# Patient Record
Sex: FEMALE | HISPANIC OR LATINO | Employment: FULL TIME | URBAN - METROPOLITAN AREA
[De-identification: names, ages, dates, MRNs, and addresses within clinical notes are randomized per-mention and may not be internally consistent; named-entity substitution may affect disease eponyms.]

---

## 2018-07-13 ENCOUNTER — PATIENT OUTREACH (OUTPATIENT)
Dept: HEALTH INFORMATION MANAGEMENT | Facility: OTHER | Age: 28
End: 2018-07-13

## 2023-08-15 ENCOUNTER — OFFICE VISIT (OUTPATIENT)
Dept: URGENT CARE | Facility: CLINIC | Age: 33
End: 2023-08-15
Payer: COMMERCIAL

## 2023-08-15 VITALS
DIASTOLIC BLOOD PRESSURE: 80 MMHG | SYSTOLIC BLOOD PRESSURE: 112 MMHG | WEIGHT: 145 LBS | OXYGEN SATURATION: 98 % | TEMPERATURE: 98.7 F | RESPIRATION RATE: 16 BRPM | BODY MASS INDEX: 27.38 KG/M2 | HEART RATE: 72 BPM | HEIGHT: 61 IN

## 2023-08-15 DIAGNOSIS — R10.9 ABDOMINAL PAIN, UNSPECIFIED ABDOMINAL LOCATION: ICD-10-CM

## 2023-08-15 LAB
APPEARANCE UR: CLEAR
BILIRUB UR STRIP-MCNC: NEGATIVE MG/DL
COLOR UR AUTO: YELLOW
GLUCOSE UR STRIP.AUTO-MCNC: NEGATIVE MG/DL
KETONES UR STRIP.AUTO-MCNC: NEGATIVE MG/DL
LEUKOCYTE ESTERASE UR QL STRIP.AUTO: NEGATIVE
NITRITE UR QL STRIP.AUTO: NEGATIVE
PH UR STRIP.AUTO: 5.5 [PH] (ref 5–8)
POCT INT CON NEG: NEGATIVE
POCT INT CON POS: POSITIVE
POCT URINE PREGNANCY TEST: NEGATIVE
PROT UR QL STRIP: NEGATIVE MG/DL
RBC UR QL AUTO: NEGATIVE
SP GR UR STRIP.AUTO: 1
UROBILINOGEN UR STRIP-MCNC: 0.2 MG/DL

## 2023-08-15 PROCEDURE — 3079F DIAST BP 80-89 MM HG: CPT | Performed by: NURSE PRACTITIONER

## 2023-08-15 PROCEDURE — 99203 OFFICE O/P NEW LOW 30 MIN: CPT | Performed by: NURSE PRACTITIONER

## 2023-08-15 PROCEDURE — 81002 URINALYSIS NONAUTO W/O SCOPE: CPT | Performed by: NURSE PRACTITIONER

## 2023-08-15 PROCEDURE — 3074F SYST BP LT 130 MM HG: CPT | Performed by: NURSE PRACTITIONER

## 2023-08-15 PROCEDURE — 81025 URINE PREGNANCY TEST: CPT | Performed by: NURSE PRACTITIONER

## 2023-08-15 ASSESSMENT — ENCOUNTER SYMPTOMS
FEVER: 0
VOMITING: 0
ABDOMINAL PAIN: 1
FLATUS: 0
DIARRHEA: 0
NAUSEA: 0

## 2023-08-15 ASSESSMENT — FIBROSIS 4 INDEX: FIB4 SCORE: 0.93

## 2023-08-15 NOTE — LETTER
August 15, 2023    To Whom It May Concern:         This is confirmation that Varsha FoleyCandace attended her scheduled appointment with DAVID Yancey on 8/15/23. Please excuse her absence due to an acute illness.          If you have any questions please do not hesitate to call me at the phone number listed below.    Sincerely,          FELY Yancey.  [phone]

## 2023-08-16 ENCOUNTER — HOSPITAL ENCOUNTER (OUTPATIENT)
Dept: RADIOLOGY | Facility: MEDICAL CENTER | Age: 33
End: 2023-08-16
Attending: NURSE PRACTITIONER
Payer: COMMERCIAL

## 2023-08-16 DIAGNOSIS — R10.9 ABDOMINAL PAIN, UNSPECIFIED ABDOMINAL LOCATION: ICD-10-CM

## 2023-08-16 PROCEDURE — 76830 TRANSVAGINAL US NON-OB: CPT

## 2023-08-16 NOTE — PROGRESS NOTES
"Subjective:     Varsha Arias is a 32 y.o. female who presents for Abdominal Pain (Left lower x 1 day)      Abdominal Pain  This is a new problem. The current episode started in the past 7 days (3 days of LLQ abd pain). The problem has been gradually worsening. The pain is located in the LLQ. The pain is at a severity of 5/10. Pertinent negatives include no diarrhea, fever, flatus, nausea or vomiting. The pain is relieved by Nothing. Treatments tried: motrin. The treatment provided moderate relief. Her past medical history is significant for abdominal surgery. c section         Review of Systems   Constitutional:  Negative for fever.   Gastrointestinal:  Positive for abdominal pain. Negative for diarrhea, flatus, nausea and vomiting.       PMH:   Past Medical History:   Diagnosis Date    History of  3/27/2013     ALLERGIES: No Known Allergies  SURGHX:   Past Surgical History:   Procedure Laterality Date    REPEAT C SECTION  2014    Performed by Rubio Meeks M.D. at [facility]    PRIMARY C SECTION  3/22/12    Emergency Failure to progress passed 7cm     SOCHX:   Social History     Socioeconomic History    Marital status: Single   Tobacco Use    Smoking status: Never    Smokeless tobacco: Never   Vaping Use    Vaping Use: Never used   Substance and Sexual Activity    Alcohol use: No     Comment: none    Drug use: No     Comment: none    Sexual activity: Never     Partners: Male     Comment: none   Social History Narrative    ** Merged History Encounter **          FH: No family history on file.      Objective:   /80 (BP Location: Left arm, Patient Position: Sitting, BP Cuff Size: Adult)   Pulse 72   Temp 37.1 °C (98.7 °F)   Resp 16   Ht 1.549 m (5' 1\")   Wt 65.8 kg (145 lb)   SpO2 98%   BMI 27.40 kg/m²     Physical Exam  Vitals and nursing note reviewed.   Constitutional:       General: She is not in acute distress.     Appearance: Normal appearance. She is not " ill-appearing.   HENT:      Head: Normocephalic and atraumatic.      Right Ear: External ear normal.      Left Ear: External ear normal.      Nose: No congestion or rhinorrhea.      Mouth/Throat:      Mouth: Mucous membranes are moist.   Eyes:      Extraocular Movements: Extraocular movements intact.      Pupils: Pupils are equal, round, and reactive to light.   Cardiovascular:      Rate and Rhythm: Normal rate and regular rhythm.      Pulses: Normal pulses.      Heart sounds: Normal heart sounds.   Pulmonary:      Effort: Pulmonary effort is normal.      Breath sounds: Normal breath sounds.   Abdominal:      General: Abdomen is flat. Bowel sounds are normal.      Palpations: Abdomen is soft.      Tenderness: There is abdominal tenderness in the left lower quadrant. There is no right CVA tenderness, left CVA tenderness, guarding or rebound.       Musculoskeletal:         General: Normal range of motion.      Cervical back: Normal range of motion and neck supple.   Skin:     General: Skin is warm and dry.      Capillary Refill: Capillary refill takes less than 2 seconds.   Neurological:      General: No focal deficit present.      Mental Status: She is alert and oriented to person, place, and time. Mental status is at baseline.   Psychiatric:         Mood and Affect: Mood normal.         Behavior: Behavior normal.         Thought Content: Thought content normal.         Judgment: Judgment normal.         Assessment/Plan:   Assessment    1. Abdominal pain, unspecified abdominal location  POCT Pregnancy    POCT Urinalysis    US-PELVIC COMPLETE (TRANSABDOMINAL/TRANSVAGINAL) (COMBO)        Ultrasound ordered stat for evaluation of left lower quadrant pain/pelvic pain.  Differential diagnoses discussed.  She is due to start her menses and this may be related to ovarian cyst.  She notes that she has had ovarian cyst in the past.  Pregnancy and urine negative in the clinic.  Strict ER precautions given for worsening pain.   Patient to self schedule ultrasound tomorrow morning as scheduling is now closed.  She is in agreement with plan of care.

## 2023-08-18 DIAGNOSIS — N83.202 HEMORRHAGIC CYST OF LEFT OVARY: ICD-10-CM

## 2024-04-04 ENCOUNTER — OFFICE VISIT (OUTPATIENT)
Dept: URGENT CARE | Facility: PHYSICIAN GROUP | Age: 34
End: 2024-04-04
Payer: COMMERCIAL

## 2024-04-04 ENCOUNTER — HOSPITAL ENCOUNTER (OUTPATIENT)
Dept: RADIOLOGY | Facility: MEDICAL CENTER | Age: 34
End: 2024-04-04
Attending: PHYSICIAN ASSISTANT
Payer: COMMERCIAL

## 2024-04-04 VITALS
DIASTOLIC BLOOD PRESSURE: 72 MMHG | RESPIRATION RATE: 16 BRPM | BODY MASS INDEX: 28.13 KG/M2 | WEIGHT: 149 LBS | OXYGEN SATURATION: 97 % | HEART RATE: 68 BPM | SYSTOLIC BLOOD PRESSURE: 110 MMHG | TEMPERATURE: 98.4 F | HEIGHT: 61 IN

## 2024-04-04 DIAGNOSIS — R10.2 PELVIC PAIN: ICD-10-CM

## 2024-04-04 LAB
APPEARANCE UR: CLEAR
BILIRUB UR STRIP-MCNC: NEGATIVE MG/DL
COLOR UR AUTO: YELLOW
GLUCOSE UR STRIP.AUTO-MCNC: NEGATIVE MG/DL
KETONES UR STRIP.AUTO-MCNC: NEGATIVE MG/DL
LEUKOCYTE ESTERASE UR QL STRIP.AUTO: NEGATIVE
NITRITE UR QL STRIP.AUTO: NEGATIVE
PH UR STRIP.AUTO: 6 [PH] (ref 5–8)
POCT INT CON NEG: NEGATIVE
POCT INT CON POS: POSITIVE
POCT URINE PREGNANCY TEST: NEGATIVE
PROT UR QL STRIP: NEGATIVE MG/DL
RBC UR QL AUTO: NORMAL
SP GR UR STRIP.AUTO: <=1.005
UROBILINOGEN UR STRIP-MCNC: 0.2 MG/DL

## 2024-04-04 PROCEDURE — 81025 URINE PREGNANCY TEST: CPT | Performed by: PHYSICIAN ASSISTANT

## 2024-04-04 PROCEDURE — 81002 URINALYSIS NONAUTO W/O SCOPE: CPT | Performed by: PHYSICIAN ASSISTANT

## 2024-04-04 PROCEDURE — 99214 OFFICE O/P EST MOD 30 MIN: CPT | Performed by: PHYSICIAN ASSISTANT

## 2024-04-04 PROCEDURE — 3078F DIAST BP <80 MM HG: CPT | Performed by: PHYSICIAN ASSISTANT

## 2024-04-04 PROCEDURE — 76830 TRANSVAGINAL US NON-OB: CPT

## 2024-04-04 PROCEDURE — 3074F SYST BP LT 130 MM HG: CPT | Performed by: PHYSICIAN ASSISTANT

## 2024-04-04 ASSESSMENT — ENCOUNTER SYMPTOMS
EYE REDNESS: 0
EYE DISCHARGE: 0
VOMITING: 0
ABDOMINAL PAIN: 1
NAUSEA: 0
FEVER: 0
DIARRHEA: 1

## 2024-04-04 ASSESSMENT — FIBROSIS 4 INDEX: FIB4 SCORE: 0.96

## 2024-04-04 NOTE — PROGRESS NOTES
"Subjective     Varshaepi Arias is a 33 y.o. female who presents with Abdominal Pain (Lower left, cramping /X this AM )            HPI    This is a new problem.  The patient presents to clinic complaining of intermittent pelvic pain x 2 months with associated cramping.  The patient states her intermittent pelvic pain waxes and wanes in severity.  The patient states today she is experiencing a sharp pain to the left side of her pelvic region.  The patient notes slight radiation of pain to her low back.  The patient reports no associated fever.  She also reports no nausea, vomiting, or diarrhea.  The patient states she is experiencing possible constipation with infrequent bowel movements.  The patient states her last bowel movement was earlier today.  The patient reports no bloody stools.  She also reports no urinary symptoms.  No dysuria.  The patient states she is experiencing intermittent spotting with associated \"bloody mucus\".  The patient states she has had similar spotting last month prior to her menstrual period.  The patient states her last menstrual period was approximately 1 week ago.  The patient reports no abnormal vaginal discharge/drainage.  The patient has not taken any OTC medications for her current symptoms.  The patient states she is scheduled to follow-up with gynecology on 2024.  The patient states she has had 3 prior C-sections.    PMH:  has a past medical history of History of  (3/27/2013).    She has no past medical history of Addisons disease ([facility]), Adrenal disorder ([facility]), Allergy, Anemia, Anxiety, Arrhythmia, Arthritis, ASTHMA, Blood transfusion, Cancer ([facility]), CATARACT, CHF (congestive heart failure) ([facility]), Clotting disorder ([facility]), COPD, Cushings syndrome ([facility]), Depression, Diabetes, Diabetic neuropathy ([facility]), EMPHYSEMA, GERD (gastroesophageal reflux disease), Glaucoma, Goiter, Headache(784.0), Heart attack (HCC), Heart murmur, HIV (human immunodeficiency virus " "infection), Hyperlipidemia, Hypertension, IBD (inflammatory bowel disease), Kidney disease, Meningitis, Migraine, Muscle disorder, OSTEOPOROSIS, Parathyroid disorder (HCC), Pituitary disease (HCC), Seizure (HCC), Sickle cell disease (HCC), Stroke (HCC), Substance abuse (HCC), Thyroid disease, Tuberculosis, Ulcer, or Urinary tract infection, site not specified.  MEDS:   Current Outpatient Medications:     omeprazole (PRILOSEC OTC) 20 MG tablet, Take 1 Tab by mouth every day. (Patient not taking: Reported on 8/15/2023), Disp: 30 Tab, Rfl: 11    ondansetron (ZOFRAN) 4 MG TABS, Take 1 Tab by mouth every 8 hours as needed for Nausea/Vomiting. (Patient not taking: Reported on 8/15/2023), Disp: 10 Each, Rfl: 1    hydrocodone-acetaminophen (NORCO) 5-325 MG TABS per tablet, Take 1-2 Tabs by mouth every 6 hours as needed. (Patient not taking: Reported on 8/15/2023), Disp: 20 Each, Rfl: 0    Prenatal MV-Min-Fe Fum-FA-DHA (PRENATAL 1 PO), Take  by mouth. (Patient not taking: Reported on 8/15/2023), Disp: , Rfl:   ALLERGIES: No Known Allergies  SURGHX:   Past Surgical History:   Procedure Laterality Date    REPEAT C SECTION  4/16/2014    Performed by Rubio Meeks M.D. at [facility]    PRIMARY C SECTION  3/22/12    Emergency Failure to progress passed 7cm     SOCHX:  reports that she has never smoked. She has never used smokeless tobacco. She reports that she does not drink alcohol and does not use drugs.  FH: Family history was reviewed, no pertinent findings to report    Review of Systems   Constitutional:  Negative for fever.   Eyes:  Negative for discharge and redness.   Gastrointestinal:  Positive for abdominal pain and diarrhea. Negative for nausea and vomiting.   Genitourinary:  Negative for dysuria.              Objective     /72   Pulse 68   Temp 36.9 °C (98.4 °F) (Temporal)   Resp 16   Ht 1.549 m (5' 1\")   Wt 67.6 kg (149 lb)   SpO2 97%   BMI 28.15 kg/m²      Physical Exam  Constitutional:       " General: She is not in acute distress.     Appearance: Normal appearance. She is well-developed. She is not ill-appearing.   HENT:      Head: Normocephalic and atraumatic.      Right Ear: External ear normal.      Left Ear: External ear normal.   Eyes:      Extraocular Movements: Extraocular movements intact.      Conjunctiva/sclera: Conjunctivae normal.   Cardiovascular:      Rate and Rhythm: Normal rate and regular rhythm.      Heart sounds: Normal heart sounds.   Pulmonary:      Effort: Pulmonary effort is normal. No respiratory distress.      Breath sounds: Normal breath sounds. No wheezing.   Abdominal:      Palpations: Abdomen is soft.      Tenderness: There is abdominal tenderness in the suprapubic area. There is no right CVA tenderness, left CVA tenderness, guarding or rebound.   Musculoskeletal:         General: Normal range of motion.      Cervical back: Normal range of motion and neck supple.   Skin:     General: Skin is warm and dry.   Neurological:      Mental Status: She is alert and oriented to person, place, and time.               Progress:    The patient was previously seen in clinic in August 2023 for similar symptoms.  The patient had a pelvic ultrasound done at that time, which showed a myometrial lesion measuring up to 4.3 cm likely related to a fibroid, as well as a likely hemorrhagic cyst of the left ovary.  Follow-up was recommended regarding the patient's hemorrhagic cyst.  However, the patient states she never followed up as directed.    Results for orders placed or performed in visit on 04/04/24   POCT Urinalysis   Result Value Ref Range    POC Color yellow Negative    POC Appearance clear Negative    POC Glucose negative Negative mg/dL    POC Bilirubin negative Negative mg/dL    POC Ketones negative Negative mg/dL    POC Specific Gravity <=1.005 <1.005 - >1.030    POC Blood trace-lysed Negative    POC Urine PH 6.0 5.0 - 8.0    POC Protein negative Negative mg/dL    POC Urobiligen 0.2  Negative (0.2) mg/dL    POC Nitrites negative Negative    POC Leukocyte Esterase negative Negative   POCT Pregnancy   Result Value Ref Range    POC Urine Pregnancy Test Negative     Internal Control Positive Positive     Internal Control Negative Negative          Pelvic US:  COMPARISON:   8/16/2023     FINDINGS:  Both transabdominal and transvaginal scanning were performed to optimally visualize the pelvis.     UTERUS:  The uterus measures 4.95 cm x 7.55 cm x 6.39 cm.  The uterine myometrium is inhomogeneous. There is a 3.3 cm fibroid in the posterior uterine fundus with some deformity of the adjacent endometrium.     The endometrial echo complex measures 1.00 cm.  The endometrium is unremarkable in appearance and thickness for age and menstrual status.        OVARIES:  The right ovary measures 3.14 cm x 2.41 cm x 2.51 cm. Duplex Doppler examination of the right ovary shows normal waveforms.     The left ovary measures 3.59 cm x 2.00 cm x 1.90 cm. Duplex Doppler examination of the left ovary shows normal waveforms.     Small free fluid is seen.     IMPRESSION:     1.  A 3.3 cm fibroid is noted in the posterior uterine fundus, similar to prior.     2.  Unremarkable bilateral ovaries.                    Assessment & Plan          1. Pelvic pain  - POCT Urinalysis  - POCT Pregnancy  - US-PELVIC COMPLETE (TRANSABDOMINAL/TRANSVAGINAL) (COMBO); Future  - naproxen (NAPROSYN) 500 MG Tab; Take 1 Tablet by mouth 2 times a day with meals.  Dispense: 60 Tablet; Refill: 0      The patient's presenting symptoms and physical exam findings are consistent with pelvic pain.  This has been an ongoing problem.  On physical exam, the patient had mild suprapubic abdominal tenderness without guarding or rebound.  No CVA tenderness was appreciated.  The remainder the patient's physical exam today in clinic was normal.  The patient is nontoxic and appears in no acute distress.  The patient's vital signs are stable and within normal limits.   She is afebrile today in clinic.  The patient's POCT urinalysis today in clinic showed trace lysed blood with negative leukocyte esterase and negative nitrates.  The patient's POCT pregnancy test was negative.  Will order a pelvic ultrasound to further evaluate the patient's current symptoms.  The patient's pelvic ultrasound showed a 3.3 cm fibroid in the posterior uterine fundus, similar to the patient's prior study. The patient's ovaries were seen as unremarkable. The cause of the patient's ongoing pelvic cramping is unknown at this time. Thankfully, the patient is scheduled to follow-up with gynecology in the coming weeks. Advised the patient to follow-up with gynecology as scheduled.  Instructed the patient to monitor for worsening signs or symptoms.  Will prescribe the patient naproxen for symptomatic leaf of her pelvic pain and cramping.  Recommend OTC medications and supportive care for symptomatic management.  Recommend patient follow-up with primary care as needed.  Discussed strict return precautions with the patient, and she verbalized understanding.    Differential diagnoses, supportive care, and indications for immediate follow-up discussed with patient.   Instructed to return to clinic or nearest emergency department for any change in condition, further concerns, or worsening of symptoms.    OTC Tylenol or Motrin for fever/discomfort.  Drink plenty of fluids  Follow-up with PCP  Return to clinic or go to the ED if symptoms worsen or fail to improve, or if the patient should develop worsening/increasing/persistent pelvic pain, abnormal vaginal bleeding, abnormal vaginal discharge, urinary symptoms, hematuria, flank pain, abdominal pain, nausea/vomiting, fever/chills, and/or any concerning symptoms.    Discussed plan with the patient, and she agrees to the above.     I personally reviewed prior external notes and test results pertinent to today's visit.  I have independently reviewed and interpreted all  diagnostics ordered during this urgent care visit.     Please note that this dictation was created using voice recognition software. I have made every reasonable attempt to correct obvious errors, but I expect that there may be errors of grammar and possibly content that I did not discover before finalizing the note.     This note was electronically signed by Brigitte Morgan PA-C

## 2024-04-05 RX ORDER — NAPROXEN 500 MG/1
500 TABLET ORAL 2 TIMES DAILY WITH MEALS
Qty: 60 TABLET | Refills: 0 | Status: SHIPPED | OUTPATIENT
Start: 2024-04-05 | End: 2024-04-19

## 2024-04-19 ENCOUNTER — GYNECOLOGY VISIT (OUTPATIENT)
Dept: OBGYN | Facility: CLINIC | Age: 34
End: 2024-04-19
Payer: COMMERCIAL

## 2024-04-19 ENCOUNTER — HOSPITAL ENCOUNTER (OUTPATIENT)
Facility: MEDICAL CENTER | Age: 34
End: 2024-04-19
Attending: OBSTETRICS & GYNECOLOGY
Payer: COMMERCIAL

## 2024-04-19 VITALS — SYSTOLIC BLOOD PRESSURE: 80 MMHG | WEIGHT: 143 LBS | DIASTOLIC BLOOD PRESSURE: 63 MMHG | BODY MASS INDEX: 27.02 KG/M2

## 2024-04-19 DIAGNOSIS — R10.2 PELVIC PAIN: Primary | ICD-10-CM

## 2024-04-19 DIAGNOSIS — D25.0 SUBMUCOUS LEIOMYOMA OF UTERUS: ICD-10-CM

## 2024-04-19 DIAGNOSIS — Z12.4 SCREENING FOR MALIGNANT NEOPLASM OF CERVIX: ICD-10-CM

## 2024-04-19 DIAGNOSIS — Z30.011 ENCOUNTER FOR INITIAL PRESCRIPTION OF CONTRACEPTIVE PILLS: ICD-10-CM

## 2024-04-19 PROBLEM — D25.9 UTERINE FIBROID: Status: ACTIVE | Noted: 2024-04-19

## 2024-04-19 PROBLEM — R10.32 LLQ PAIN: Status: ACTIVE | Noted: 2024-04-19

## 2024-04-19 PROCEDURE — 88175 CYTOPATH C/V AUTO FLUID REDO: CPT

## 2024-04-19 PROCEDURE — 3074F SYST BP LT 130 MM HG: CPT | Performed by: OBSTETRICS & GYNECOLOGY

## 2024-04-19 PROCEDURE — 3078F DIAST BP <80 MM HG: CPT | Performed by: OBSTETRICS & GYNECOLOGY

## 2024-04-19 PROCEDURE — 87624 HPV HI-RISK TYP POOLED RSLT: CPT

## 2024-04-19 PROCEDURE — 99203 OFFICE O/P NEW LOW 30 MIN: CPT | Performed by: OBSTETRICS & GYNECOLOGY

## 2024-04-19 RX ORDER — NORETHINDRONE ACETATE AND ETHINYL ESTRADIOL AND FERROUS FUMARATE 1MG-20(24)
1 KIT ORAL DAILY
Qty: 28 TABLET | Refills: 5 | Status: SHIPPED | OUTPATIENT
Start: 2024-04-19

## 2024-04-19 ASSESSMENT — FIBROSIS 4 INDEX: FIB4 SCORE: 0.96

## 2024-04-19 NOTE — PROGRESS NOTES
Patient here for GYN exam.  LMP= 4/19/24  BCM:none   Last pap: 3 years ago per pt   Phone number: [phone] (home)   Pharmacy verified    Pt states been having pelvic pain ( left side) since August 2023

## 2024-04-19 NOTE — PROGRESS NOTES
New GYN Problem Note    CC:   Gynecologic Exam      HPI:   Patient is a 33 y.o.  who presents complaining of intermittent but persistent LLQ pain since about August. She went to the ED in August for this and was dx with a small hemorrhagic ovarian cyst at that time but she was again in ED in early April for the same complaints and US this time was normal with no ovarian findings whatsoever.  She was noted on both US to have a 3cm posterior uterine fibroid abutting the endometrium but patient actually has no menstrual complaints. She reports regular menses and not too heavy or painful.  She states she will notice the same LLQ pain about a week before menses and a week after menses as well as intermittently and/or during intercourse.  This is a completely new problem since August and NOT something she has felt all her life.       GYNECOLOGIC HISTORY:   Current Sexual Activity: yes  History of sexually transmitted diseases? No  Abnormal discharge? No     Menstrual History  Patient's last menstrual period was No LMP recorded.  Periods are regular  q 28 days, lasting 5-6 days.     Clots or heavy flow: No  Intermenstrual bleeding/spotting: No  Sexual problems: No  Significant pelvic pain: No  Bothersome PMS: No  Bothersome menopausal symptoms: No     Contraception  none     Pap History  Last Pap: ?  Hx Moderate or Severe Dysplasia : No     Sexually active:    Social History     Substance and Sexual Activity   Sexual Activity Yes    Partners: Male    Comment: none       OBSTETRIC HISTORY:  OB History    Para Term  AB Living   5 3 3 0 2 3   SAB IAB Ectopic Molar Multiple Live Births   2 0 0     3      # Outcome Date GA Lbr Bk/2nd Weight Sex Delivery Anes PTL Lv   5 Term 16 40w0d  7 lb 8 oz M CS-LTranv  N ИВАН   4 Term 14 39w1d  7 lb 15 oz F CS-LTranv Spinal  ИВАН      Birth Comments: repeat c/s    3 SAB 2013 8w0d          2 Term 12 40w2d  7 lb 14 oz M CS-LTranv EPI N ИВАН      Birth  Comments: csection for failure to progress   1 SAB                MEDICAL HISTORY:  Past Medical History:   Diagnosis Date    History of  3/27/2013       SURGICAL HISTORY:  Past Surgical History:   Procedure Laterality Date    REPEAT C SECTION  2014    Performed by Rubio Meeks M.D. at [facility]    PRIMARY C SECTION  3/22/12    Emergency Failure to progress passed 7cm       FAMILY HISTORY:  History reviewed. No pertinent family history.    ALLERGIES / REACTIONS:  No Known Allergies    SOCIAL HISTORY:   reports that she has never smoked. She has never used smokeless tobacco. She reports that she does not drink alcohol and does not use drugs.    ROS:   Positive ROS: none  Gen: no fevers or chills, no significant weight loss or gain, excessive fatigue  Respiratory:  no cough or dyspnea  Cardiac:  no chest pain, no palpitations, no syncope  Breast: no breast discharge, pain, lump or skin changes  GI:  no heartburn, no abdominal pain, no nausea or vomiting  Urinary: no dysuria, urgency, frequency, incontinence   Psych: no depression or anxiety  Neuro: no migraines with aura, fainting spells, numbness or tingling  Extremities: no joint pain, persistently swollen ankles, recurrent leg cramps       PHYSICAL EXAMINATION:  Vital Signs:   Vitals:    24 1031   BP: (!) 80/63   Weight: 143 lb     Body mass index is 27.02 kg/m².  Constitutional: The patient is well developed and well nourished.  Psychiatric: Patient is oriented to time place and person.   Skin: No rash observed.  Neck: Neck appears symmetric.  Respiratory: normal effort  Abdomen: Soft, non-tender.  Pelvic Exam:     External female genitalia: normal appearance    Urethra - no lesions, no erythema    Vagina: moist, pink, normal ruggae    Cervix: pink, smooth, no lesions, no CMT; posterior to the cervix on bimanual exam there is a 1cm round circumscribed cyst-like mass in the posterior cul-de-sac.  Pt states palpation here reproduces  her pain    Uterus - non-tender, normal size, shape, contour, mobile, anteverted    Ovaries: non-tender, no appreciable masses  Pap obtained  Extremeties: Legs are symmetric and without tenderness. There is no edema present.    ASSESSMENT AND PLAN:  33 y.o.       1. Submucous leiomyoma of uterus   - discussed fibroid on US however I do not think this is related to her pain and she does not seem to have any menstrual complaints that may be related to fibroid   - offered reassurance at this time    2. Pelvic pain   - discussed possibility of non-gynecologic nature, ovulatory pain or even endometriosis however it is unusual that this is a new problem for her   - unclear the relation or pathology of the cyst felt on exam but this did reproduce her pain   - may need to consider laparoscopy in the future if pain persists   - discussed that since she is NOT on any form of contraception and since hormonal contraception is the first line treatment in ovulatory pain and endometriosis, I suggested we trial this first.  She is amenable. She has no contraindications to COCs    - she started menses today so we discussed starting pill today or this upcoming     - Norethin Ace-Eth Estrad-FE (JUNEL FE 24) 1-20 MG-MCG(24) Tab; Take 1 Tablet by mouth every day.  Dispense: 28 Tablet; Refill: 5    3. Screening for malignant neoplasm of cervix   - pap updated  - THINPREP PAP WITH HPV; Future    4. Encounter for initial prescription of contraceptive pills   - as above   - OCP for contraception and pelvic pain    - Norethin Ace-Eth Estrad-FE (JUNEL FE 24) 1-20 MG-MCG(24) Tab; Take 1 Tablet by mouth every day.  Dispense: 28 Tablet; Refill: 5        Frida Savage D.O.

## 2024-04-20 DIAGNOSIS — Z12.4 SCREENING FOR MALIGNANT NEOPLASM OF CERVIX: ICD-10-CM

## 2024-04-24 LAB
CYTOLOGIST CVX/VAG CYTO: NORMAL
CYTOLOGY CVX/VAG DOC CYTO: NORMAL
CYTOLOGY CVX/VAG DOC THIN PREP: NORMAL
HPV I/H RISK 4 DNA CVX QL PROBE+SIG AMP: NEGATIVE
NOTE NL11727A: NORMAL
OTHER STN SPEC: NORMAL
STAT OF ADQ CVX/VAG CYTO-IMP: NORMAL